# Patient Record
Sex: FEMALE | Race: WHITE
[De-identification: names, ages, dates, MRNs, and addresses within clinical notes are randomized per-mention and may not be internally consistent; named-entity substitution may affect disease eponyms.]

---

## 2019-11-26 ENCOUNTER — HOSPITAL ENCOUNTER (EMERGENCY)
Dept: HOSPITAL 39 - ER | Age: 29
Discharge: HOME | End: 2019-11-26
Payer: SELF-PAY

## 2019-11-26 VITALS — OXYGEN SATURATION: 99 % | DIASTOLIC BLOOD PRESSURE: 77 MMHG | TEMPERATURE: 97.3 F | SYSTOLIC BLOOD PRESSURE: 122 MMHG

## 2019-11-26 DIAGNOSIS — Z88.1: ICD-10-CM

## 2019-11-26 DIAGNOSIS — Z87.891: ICD-10-CM

## 2019-11-26 DIAGNOSIS — J45.909: ICD-10-CM

## 2019-11-26 DIAGNOSIS — F32.9: ICD-10-CM

## 2019-11-26 DIAGNOSIS — J11.1: Primary | ICD-10-CM

## 2019-11-26 NOTE — ED.PDOC
History of Present Illness





- General


Chief Complaint: Fever


Time Seen by Provider: 19 17:32


Source: patient, family





- History of Present Illness


Initial Comments: 





29-year-old female presents with body aches, fever, nonproductive cough with 

sick child with similar symptoms.  Symptom onset this morning, no medication 

taken, did not get influenza immunization this year.


Allergies/Adverse Reactions: 


Allergies





Cephalexin [From Keflex] Allergy (Verified 19 18:08)


   





Home Medications: 


Ambulatory Orders





Benzonatate Perles [Tessalon Perles] 100 mg PO TID PRN #15 cap 19 


Ondansetron Odt [Zofran ODT] 4 mg PO Q8H PRN #10 tab 19 


Oseltamivir Capsule [Tamiflu] 75 mg PO BID 5 Days #10 capsule 19 











Review of Systems





- Review of Systems


Review of Systems: 





19 17:37


General: Denies generalized weakness, does have fever, arthralgia/myalgia


HEENT: Denies sore throat, rhinorrhea


Cardiovascular: Denies chest pain, palpitations


Respiratory: Denies SOB, has cough


Gastrointestinal: Denies abdominal pain, vomiting, diarrhea


: Denies dysuria, frequency


Musculoskeletal: Denies extremity pain, extremity swelling


Integument: Denies rash, itching


Neuro: Denies focal weakness or numbness


Psych: Denies depression, hallucinations.





Past Medical History (General)





- Patient Medical History


Hx Seizures: No


Hx Stroke: No


Hx Dementia: No


Hx Asthma: Yes


Hx of COPD: No


Hx Cardiac Disorders: No


Hx Congestive Heart Failure: No


Hx Pacemaker: No


Hx Hypertension: No


Hx Thyroid Disease: No


Hx Diabetes: No


Hx Gastroesophageal Reflux: No


Hx Renal Disease: No


Hx Cancer: No


Hx of HIV: No


Hx Hepatitis C: No


Hx MRSA: No





- Vaccination History


Hx Tetanus, Diphtheria Vaccination: Yes - 3yrs ago


Hx Influenza Vaccination: No


Hx Pneumococcal Vaccination: No





- Social History


Hx Tobacco Use: Yes


Hx Chewing Tobacco Use: No


Hx Alcohol Use: Yes - occ


Hx Substance Use: No


Hx Substance Use Treatment: No


Hx Depression: Yes


Hx Physical Abuse: No


Hx Emotional Abuse: No


Hx Suspected Abuse: No





- Female History


Hx Last Menstrual Period: 16


Patient Pregnant: No





Family Medical History





- Family History


  ** Mother


Family History: Unknown


Living Status: 





Physical Exam





- Physical Exam


Comments: 





General Appearance: Patient is awake and alert. 


Skin:  Warm and dry. No diaphoresis. No rash or other lesions.


Head: Normocephalic/atraumatic.


Eyes: PERRL, lids, conjunctiva and sclera unremarkable. EOMI intact. 


ENT: No nasal discharge. Oropharynx. Without erythema, exudate,  lesions. Moist 

mucous membranes. 


Neck: Supple. No LAD. No tenderness. No JVD noted.


Respiratory: Normal rate and effort. Breath sounds clear bilaterally.


Cardiovascular: Regular rate. Heart sounds normal. No murmur. 


GI: Abdomen soft, non-distended and non-tender. No rebound/guarding. Bowel 

sounds normal. 


Back: No tenderness


Musculoskeletal: Extremities- Normal range of motion. No effusion, cyanosis, 

edema.


Neurological: Alert. No facial palsy. Speech clear. Gag intact. No motor 

deficit, str symmetric. No sensory deficit.





Progress





- Progress


Progress: 





19 17:53


VS, exam remain reassuring. I have discussed findings, diff dx, plan of care, 

need for follow-up, and reasons to return to the ED.





Safety Stop (Diagnostic Time-Out):





Tachycardia: No


Diagnostic Studies: Reviewed


Diagnostic Certainty: moderate


Patient/family feels safe with discharge: Yes





Departure





- Departure


Clinical Impression: 


 Influenza-like illness





Time of Disposition: 18:00


Disposition: Discharge to Home or Self Care


Condition: Good


Departure Forms:  ED Discharge - Pt. Copy, Patient Portal Self Enrollment


Instructions:  Flu


Prescriptions: 


Benzonatate Perles [Tessalon Perles] 100 mg PO TID PRN #15 cap


 PRN Reason: Cough


Ondansetron Odt [Zofran ODT] 4 mg PO Q8H PRN #10 tab


 PRN Reason: Nausea


Oseltamivir Capsule [Tamiflu] 75 mg PO BID 5 Days #10 capsule


Home Medications: 


Ambulatory Orders





Benzonatate Perles [Tessalon Perles] 100 mg PO TID PRN #15 cap 19 


Ondansetron Odt [Zofran ODT] 4 mg PO Q8H PRN #10 tab 19 


Oseltamivir Capsule [Tamiflu] 75 mg PO BID 5 Days #10 capsule 19 








Comments: 





Waldemar Smith MD


Emergency Medicine


#[id number]